# Patient Record
Sex: MALE | Race: WHITE | NOT HISPANIC OR LATINO | URBAN - METROPOLITAN AREA
[De-identification: names, ages, dates, MRNs, and addresses within clinical notes are randomized per-mention and may not be internally consistent; named-entity substitution may affect disease eponyms.]

---

## 2018-05-18 ENCOUNTER — OFFICE VISIT - HEALTHEAST (OUTPATIENT)
Dept: FAMILY MEDICINE | Facility: CLINIC | Age: 48
End: 2018-05-18

## 2018-05-18 ENCOUNTER — COMMUNICATION - HEALTHEAST (OUTPATIENT)
Dept: SCHEDULING | Facility: CLINIC | Age: 48
End: 2018-05-18

## 2018-05-18 DIAGNOSIS — S91.331A PUNCTURE WOUND OF RIGHT FOOT, INITIAL ENCOUNTER: ICD-10-CM

## 2018-05-18 DIAGNOSIS — T14.90XA INJURY: ICD-10-CM

## 2021-06-01 VITALS — WEIGHT: 230.1 LBS

## 2021-06-26 NOTE — PROGRESS NOTES
Progress Notes by Kell Chaparro PA-C at 5/18/2018  4:50 PM     Author: Kell Chaparro PA-C Service: -- Author Type: Physician Assistant    Filed: 5/18/2018  5:44 PM Encounter Date: 5/18/2018 Status: Signed    : Kell Chaparro PA-C (Physician Assistant)       Subjective:      Patient ID: Luigi Nguyễn is a 47 y.o. male.    Chief Complaint:    HPI Luigi Nguyễn is a 47 y.o. male w/ PHMx of DM who presents today for an update of his tetanus vaccine after stepping on a mela nail while wearing a rubber soled shoe about 2 hours ago.  The nail did puncture the base of his foot very small amount.  He opal a very small amount of blood.  He is not reporting any pain or foreign body retention.      Past Medical History:   Diagnosis Date   ? Diabetes          Social History   Substance Use Topics   ? Smoking status: Never Smoker   ? Smokeless tobacco: Never Used   ? Alcohol use None       Review of Systems   Skin: Positive for wound.   All other systems reviewed and are negative.      Objective:     /76 (Patient Site: Right Arm, Patient Position: Sitting, Cuff Size: Adult Large)  Pulse 79  Temp 97.8  F (36.6  C) (Oral)   Resp 20  Wt (!) 230 lb 1.6 oz (104.4 kg)  SpO2 98%    Physical Exam   Constitutional: He appears well-developed and well-nourished. No distress.   HENT:   Head: Normocephalic and atraumatic.   Right Ear: External ear normal.   Left Ear: External ear normal.   Eyes: Conjunctivae are normal.   Pulmonary/Chest: Effort normal. No respiratory distress.   Musculoskeletal:        Feet:    Skin: He is not diaphoretic.   Pinpoint puncture wound without active bleeding present on the plantar surface of the patient right foot. See diagram for location. No signs of retained foreign body.    Psychiatric: He has a normal mood and affect. His behavior is normal. Judgment and thought content normal.   Nursing note and vitals reviewed.      Clinical Decision Making:  Tetanus injection updated today.  No  current signs of any infection, retained foreign body, or significant wound.  Since the patient is a diabetic and there has been a puncture wound through the sole of her rubber shoe will start the patient on prophylactic antibiotics with ciprofloxacin to have coverage for Pseudomonas Aeruginosa.      Assessment:     Procedures    1. Puncture wound of right foot, initial encounter  ciprofloxacin HCl (CIPRO) 500 MG tablet   2. Injury  Td, Preservative Free (green label)         Patient Instructions   1. Take antibiotic according to bottle instructions.   2. Follow up if you develop a tendonitis or if you develop redness, worsening pain, swelling, or fever.